# Patient Record
Sex: FEMALE | URBAN - METROPOLITAN AREA
[De-identification: names, ages, dates, MRNs, and addresses within clinical notes are randomized per-mention and may not be internally consistent; named-entity substitution may affect disease eponyms.]

---

## 2018-01-08 ENCOUNTER — APPOINTMENT (RX ONLY)
Dept: URBAN - METROPOLITAN AREA CLINIC 114 | Facility: CLINIC | Age: 63
Setting detail: DERMATOLOGY
End: 2018-01-08

## 2018-01-08 DIAGNOSIS — D18.0 HEMANGIOMA: ICD-10-CM

## 2018-01-08 DIAGNOSIS — L82.1 OTHER SEBORRHEIC KERATOSIS: ICD-10-CM

## 2018-01-08 DIAGNOSIS — L81.4 OTHER MELANIN HYPERPIGMENTATION: ICD-10-CM

## 2018-01-08 DIAGNOSIS — Z87.2 PERSONAL HISTORY OF DISEASES OF THE SKIN AND SUBCUTANEOUS TISSUE: ICD-10-CM

## 2018-01-08 PROBLEM — D18.01 HEMANGIOMA OF SKIN AND SUBCUTANEOUS TISSUE: Status: ACTIVE | Noted: 2018-01-08

## 2018-01-08 PROBLEM — D22.61 MELANOCYTIC NEVI OF RIGHT UPPER LIMB, INCLUDING SHOULDER: Status: ACTIVE | Noted: 2018-01-08

## 2018-01-08 PROBLEM — L90.5 SCAR CONDITIONS AND FIBROSIS OF SKIN: Status: ACTIVE | Noted: 2018-01-08

## 2018-01-08 PROBLEM — D23.71 OTHER BENIGN NEOPLASM OF SKIN OF RIGHT LOWER LIMB, INCLUDING HIP: Status: ACTIVE | Noted: 2018-01-08

## 2018-01-08 PROBLEM — L57.8 OTHER SKIN CHANGES DUE TO CHRONIC EXPOSURE TO NONIONIZING RADIATION: Status: ACTIVE | Noted: 2018-01-08

## 2018-01-08 PROBLEM — E78.5 HYPERLIPIDEMIA, UNSPECIFIED: Status: ACTIVE | Noted: 2018-01-08

## 2018-01-08 PROBLEM — D23.72 OTHER BENIGN NEOPLASM OF SKIN OF LEFT LOWER LIMB, INCLUDING HIP: Status: ACTIVE | Noted: 2018-01-08

## 2018-01-08 PROBLEM — L91.9 HYPERTROPHIC DISORDER OF THE SKIN, UNSPECIFIED: Status: ACTIVE | Noted: 2018-01-08

## 2018-01-08 PROCEDURE — ? COUNSELING

## 2018-01-08 PROCEDURE — 99214 OFFICE O/P EST MOD 30 MIN: CPT

## 2018-01-08 PROCEDURE — ? ADDITIONAL NOTES

## 2018-01-08 ASSESSMENT — LOCATION ZONE DERM
LOCATION ZONE: FACE
LOCATION ZONE: NECK
LOCATION ZONE: TRUNK
LOCATION ZONE: ARM
LOCATION ZONE: LEG

## 2018-01-08 ASSESSMENT — LOCATION DETAILED DESCRIPTION DERM
LOCATION DETAILED: RIGHT PROXIMAL RADIAL DORSAL FOREARM
LOCATION DETAILED: RIGHT MEDIAL MALAR CHEEK
LOCATION DETAILED: RIGHT PROXIMAL POSTERIOR UPPER ARM
LOCATION DETAILED: LEFT CENTRAL MALAR CHEEK
LOCATION DETAILED: RIGHT LATERAL TRAPEZIAL NECK
LOCATION DETAILED: RIGHT RIB CAGE
LOCATION DETAILED: LEFT LATERAL TRAPEZIAL NECK
LOCATION DETAILED: EPIGASTRIC SKIN
LOCATION DETAILED: RIGHT ANTERIOR DISTAL THIGH
LOCATION DETAILED: LEFT PROXIMAL RADIAL DORSAL FOREARM
LOCATION DETAILED: LEFT ANTERIOR DISTAL THIGH
LOCATION DETAILED: RIGHT ANTERIOR DISTAL UPPER ARM
LOCATION DETAILED: LEFT ANTERIOR DISTAL UPPER ARM

## 2018-01-08 ASSESSMENT — LOCATION SIMPLE DESCRIPTION DERM
LOCATION SIMPLE: RIGHT UPPER ARM
LOCATION SIMPLE: LEFT THIGH
LOCATION SIMPLE: POSTERIOR NECK
LOCATION SIMPLE: ABDOMEN
LOCATION SIMPLE: RIGHT FOREARM
LOCATION SIMPLE: LEFT CHEEK
LOCATION SIMPLE: RIGHT CHEEK
LOCATION SIMPLE: RIGHT THIGH
LOCATION SIMPLE: LEFT UPPER ARM
LOCATION SIMPLE: RIGHT POSTERIOR UPPER ARM
LOCATION SIMPLE: LEFT FOREARM

## 2018-02-26 ENCOUNTER — APPOINTMENT (RX ONLY)
Dept: URBAN - METROPOLITAN AREA CLINIC 114 | Facility: CLINIC | Age: 63
Setting detail: DERMATOLOGY
End: 2018-02-26

## 2018-02-26 DIAGNOSIS — Z41.9 ENCOUNTER FOR PROCEDURE FOR PURPOSES OTHER THAN REMEDYING HEALTH STATE, UNSPECIFIED: ICD-10-CM

## 2018-02-26 DIAGNOSIS — L90.8 OTHER ATROPHIC DISORDERS OF SKIN: ICD-10-CM

## 2018-02-26 PROBLEM — L28.1 PRURIGO NODULARIS: Status: ACTIVE | Noted: 2018-02-26

## 2018-02-26 PROBLEM — L82.0 INFLAMED SEBORRHEIC KERATOSIS: Status: ACTIVE | Noted: 2018-02-26

## 2018-02-26 PROCEDURE — ? CHEMICAL PEEL

## 2018-02-26 ASSESSMENT — LOCATION DETAILED DESCRIPTION DERM
LOCATION DETAILED: RIGHT SUPERIOR MEDIAL BUCCAL CHEEK
LOCATION DETAILED: LEFT CENTRAL MALAR CHEEK
LOCATION DETAILED: LEFT INFERIOR MEDIAL FOREHEAD
LOCATION DETAILED: RIGHT MEDIAL FOREHEAD
LOCATION DETAILED: LEFT FOREHEAD
LOCATION DETAILED: RIGHT INFERIOR CENTRAL MALAR CHEEK
LOCATION DETAILED: LEFT CENTRAL BUCCAL CHEEK
LOCATION DETAILED: RIGHT CENTRAL MALAR CHEEK
LOCATION DETAILED: SUPERIOR MID FOREHEAD
LOCATION DETAILED: LEFT CENTRAL MALAR CHEEK
LOCATION DETAILED: RIGHT CHIN

## 2018-02-26 ASSESSMENT — LOCATION SIMPLE DESCRIPTION DERM
LOCATION SIMPLE: RIGHT CHEEK
LOCATION SIMPLE: RIGHT FOREHEAD
LOCATION SIMPLE: LEFT FOREHEAD
LOCATION SIMPLE: LEFT FOREHEAD
LOCATION SIMPLE: RIGHT CHEEK
LOCATION SIMPLE: LEFT CHEEK
LOCATION SIMPLE: SUPERIOR FOREHEAD
LOCATION SIMPLE: LEFT CHEEK
LOCATION SIMPLE: CHIN

## 2018-02-26 ASSESSMENT — LOCATION ZONE DERM
LOCATION ZONE: FACE
LOCATION ZONE: FACE

## 2018-02-26 NOTE — PROCEDURE: CHEMICAL PEEL
Price (Use Numbers Only, No Special Characters Or $): 400
Consent: Prior to the procedure, written consent was obtained and risks were reviewed, including but not limited to: redness, peeling, blistering, pigmentary change, scarring, infection, and pain.
Post-Care Instructions: I reviewed with the patient in detail post-care instructions. Patient should avoid sun exposure and wear sun protection.
Detail Level: Zone
Number Of Layers: 1
Chemical Peel: Vi
Treatment Number: 0
Prep: The treated area was degreased with pre-peel cleanser, and vaseline was applied for protection of mucous membranes.
Post Peel Care: After the procedure, a post-peel cream was applied to the treated areas. Sun protection and post-care instructions were reviewed with the patient.

## 2018-05-31 ENCOUNTER — APPOINTMENT (RX ONLY)
Dept: URBAN - METROPOLITAN AREA CLINIC 114 | Facility: CLINIC | Age: 63
Setting detail: DERMATOLOGY
End: 2018-05-31

## 2018-07-16 ENCOUNTER — APPOINTMENT (RX ONLY)
Dept: URBAN - METROPOLITAN AREA CLINIC 114 | Facility: CLINIC | Age: 63
Setting detail: DERMATOLOGY
End: 2018-07-16

## 2018-07-16 VITALS — HEIGHT: 61 IN | WEIGHT: 114 LBS

## 2018-07-16 DIAGNOSIS — L82.1 OTHER SEBORRHEIC KERATOSIS: ICD-10-CM

## 2018-07-16 DIAGNOSIS — D18.0 HEMANGIOMA: ICD-10-CM

## 2018-07-16 DIAGNOSIS — L81.4 OTHER MELANIN HYPERPIGMENTATION: ICD-10-CM

## 2018-07-16 DIAGNOSIS — D485 NEOPLASM OF UNCERTAIN BEHAVIOR OF SKIN: ICD-10-CM

## 2018-07-16 DIAGNOSIS — L71.0 PERIORAL DERMATITIS: ICD-10-CM

## 2018-07-16 PROBLEM — L57.8 OTHER SKIN CHANGES DUE TO CHRONIC EXPOSURE TO NONIONIZING RADIATION: Status: ACTIVE | Noted: 2018-07-16

## 2018-07-16 PROBLEM — L82.0 INFLAMED SEBORRHEIC KERATOSIS: Status: ACTIVE | Noted: 2018-07-16

## 2018-07-16 PROBLEM — D48.5 NEOPLASM OF UNCERTAIN BEHAVIOR OF SKIN: Status: ACTIVE | Noted: 2018-07-16

## 2018-07-16 PROBLEM — D18.01 HEMANGIOMA OF SKIN AND SUBCUTANEOUS TISSUE: Status: ACTIVE | Noted: 2018-07-16

## 2018-07-16 PROBLEM — D23.9 OTHER BENIGN NEOPLASM OF SKIN, UNSPECIFIED: Status: ACTIVE | Noted: 2018-07-16

## 2018-07-16 PROBLEM — L57.0 ACTINIC KERATOSIS: Status: ACTIVE | Noted: 2018-07-16

## 2018-07-16 PROCEDURE — 11100: CPT

## 2018-07-16 PROCEDURE — ? PRESCRIPTION SAMPLES PROVIDED

## 2018-07-16 PROCEDURE — 99214 OFFICE O/P EST MOD 30 MIN: CPT | Mod: 25

## 2018-07-16 PROCEDURE — ? COUNSELING

## 2018-07-16 PROCEDURE — ? BIOPSY BY SHAVE METHOD

## 2018-07-16 ASSESSMENT — LOCATION ZONE DERM
LOCATION ZONE: NECK
LOCATION ZONE: NOSE
LOCATION ZONE: ARM
LOCATION ZONE: FACE
LOCATION ZONE: TRUNK

## 2018-07-16 ASSESSMENT — LOCATION DETAILED DESCRIPTION DERM
LOCATION DETAILED: RIGHT DISTAL DORSAL FOREARM
LOCATION DETAILED: RIGHT LATERAL TRAPEZIAL NECK
LOCATION DETAILED: LEFT ANTERIOR DISTAL UPPER ARM
LOCATION DETAILED: RIGHT NASAL SIDEWALL
LOCATION DETAILED: LEFT LATERAL TRAPEZIAL NECK
LOCATION DETAILED: INFERIOR THORACIC SPINE
LOCATION DETAILED: RIGHT ANTERIOR SHOULDER
LOCATION DETAILED: LEFT ANTERIOR SHOULDER
LOCATION DETAILED: RIGHT INFERIOR MEDIAL UPPER BACK
LOCATION DETAILED: RIGHT ANTERIOR DISTAL UPPER ARM
LOCATION DETAILED: RIGHT PROXIMAL RADIAL DORSAL FOREARM
LOCATION DETAILED: NASAL DORSUM
LOCATION DETAILED: LEFT CENTRAL MALAR CHEEK
LOCATION DETAILED: LEFT PROXIMAL RADIAL DORSAL FOREARM

## 2018-07-16 ASSESSMENT — LOCATION SIMPLE DESCRIPTION DERM
LOCATION SIMPLE: LEFT UPPER ARM
LOCATION SIMPLE: RIGHT FOREARM
LOCATION SIMPLE: NOSE
LOCATION SIMPLE: RIGHT SHOULDER
LOCATION SIMPLE: LEFT CHEEK
LOCATION SIMPLE: RIGHT NOSE
LOCATION SIMPLE: RIGHT UPPER ARM
LOCATION SIMPLE: POSTERIOR NECK
LOCATION SIMPLE: LEFT FOREARM
LOCATION SIMPLE: RIGHT UPPER BACK
LOCATION SIMPLE: LEFT SHOULDER
LOCATION SIMPLE: UPPER BACK

## 2018-07-16 NOTE — PROCEDURE: BIOPSY BY SHAVE METHOD
Was A Bandage Applied: Yes
Wound Care: Polysporin ointment
Hemostasis: Drysol
Dressing: bandage
Size Of Lesion In Cm: 0.6
Curettage Text: The wound bed was treated with curettage after the biopsy was performed.
Anesthesia Volume In Cc: 0.5
Electrodesiccation Text: The wound bed was treated with electrodesiccation after the biopsy was performed.
Consent: Written consent was obtained and risks were reviewed including but not limited to scarring, infection, bleeding, scabbing, incomplete removal, nerve damage and allergy to anesthesia.
Post-Care Instructions: I reviewed with the patient in detail post-care instructions: If your area was covered with a Band-Aid, remove with your next bath and gently clean daily with soap and water. Do not use hydrogen peroxide. Apply a thin layer of Aquaphor/Vaseline and cover with a Band-Aid. The goal is to keep the wound moist to prevent a scab. It is normal to have a little redness 1/8 inch around the area; however, if increased redness, pain, swelling or discolored drainage occurs, call the office. If bleeding occurs, hold steady pressure for 10-15 minutes to stop it. Our office normally receives your results in 7-10 business days and will call or email you when we receive them. If you do not hear from us in two weeks, please call us.
Anesthesia Type: 1% lidocaine with epinephrine and a 1:10 solution of 8.4% sodium bicarbonate
Biopsy Type: H and E
Bill 11529 For Specimen Handling/Conveyance To Laboratory?: no
Electrodesiccation And Curettage Text: The wound bed was treated with electrodesiccation and curettage after the biopsy was performed.
Additional Anesthesia Volume In Cc (Will Not Render If 0): 0
Type Of Destruction Used: Curettage
Depth Of Biopsy: dermis
Cryotherapy Text: The wound bed was treated with cryotherapy after the biopsy was performed.
Biopsy Method: Dermablade
Billing Type: Third-Party Bill
Silver Nitrate Text: The wound bed was treated with silver nitrate after the biopsy was performed.
Detail Level: Detailed
Notification Instructions: Patient will be notified of biopsy results. However, patient instructed to call the office if not contacted within 2 weeks.

## 2021-05-24 ENCOUNTER — HOSPITAL ENCOUNTER (OUTPATIENT)
Age: 66
Discharge: HOME | End: 2021-05-24
Payer: MEDICARE

## 2021-05-24 LAB
ALBUMIN SERPL-MCNC: 4.6 G/DL (ref 3.5–5.2)
ALBUMIN/GLOBULIN RATIO: 1.8 (ref 1–2.5)
ALP BLD-CCNC: 66 U/L (ref 35–104)
ALT SERPL-CCNC: 20 U/L (ref 5–33)
ANION GAP SERPL CALCULATED.3IONS-SCNC: 8 MMOL/L (ref 9–17)
AST SERPL-CCNC: 20 U/L
BILIRUB SERPL-MCNC: 0.61 MG/DL (ref 0.3–1.2)
BUN BLDV-MCNC: 11 MG/DL (ref 8–23)
BUN/CREAT BLD: ABNORMAL (ref 9–20)
CALCIUM SERPL-MCNC: 9.7 MG/DL (ref 8.6–10.4)
CHLORIDE BLD-SCNC: 103 MMOL/L (ref 98–107)
CHOLESTEROL, FASTING: 195 MG/DL
CHOLESTEROL/HDL RATIO: 2.9
CO2: 30 MMOL/L (ref 20–31)
CREAT SERPL-MCNC: 0.5 MG/DL (ref 0.5–0.9)
GFR AFRICAN AMERICAN: >60 ML/MIN
GFR NON-AFRICAN AMERICAN: >60 ML/MIN
GFR SERPL CREATININE-BSD FRML MDRD: ABNORMAL ML/MIN/{1.73_M2}
GFR SERPL CREATININE-BSD FRML MDRD: ABNORMAL ML/MIN/{1.73_M2}
GLUCOSE BLD-MCNC: 91 MG/DL (ref 70–99)
HDLC SERPL-MCNC: 67 MG/DL
LDL CHOLESTEROL: 105 MG/DL (ref 0–130)
POTASSIUM SERPL-SCNC: 4.4 MMOL/L (ref 3.7–5.3)
SODIUM BLD-SCNC: 141 MMOL/L (ref 135–144)
TOTAL PROTEIN: 7.2 G/DL (ref 6.4–8.3)
TRIGLYCERIDE, FASTING: 115 MG/DL
VLDLC SERPL CALC-MCNC: NORMAL MG/DL (ref 1–30)

## 2021-05-24 PROCEDURE — 80053 COMPREHEN METABOLIC PANEL: CPT

## 2021-05-24 PROCEDURE — 80061 LIPID PANEL: CPT

## 2021-05-24 PROCEDURE — 36415 COLL VENOUS BLD VENIPUNCTURE: CPT

## 2021-10-31 ENCOUNTER — HOSPITAL ENCOUNTER (EMERGENCY)
Age: 66
Discharge: HOME | End: 2021-10-31
Attending: EMERGENCY MEDICINE
Payer: MEDICARE

## 2021-10-31 ENCOUNTER — APPOINTMENT (OUTPATIENT)
Dept: GENERAL RADIOLOGY | Age: 66
End: 2021-10-31
Payer: MEDICARE

## 2021-10-31 VITALS
BODY MASS INDEX: 19.83 KG/M2 | WEIGHT: 105 LBS | OXYGEN SATURATION: 98 % | DIASTOLIC BLOOD PRESSURE: 73 MMHG | HEART RATE: 97 BPM | RESPIRATION RATE: 15 BRPM | SYSTOLIC BLOOD PRESSURE: 143 MMHG | TEMPERATURE: 98.1 F | HEIGHT: 61 IN

## 2021-10-31 DIAGNOSIS — M25.572 ACUTE BILATERAL ANKLE PAIN: Primary | ICD-10-CM

## 2021-10-31 DIAGNOSIS — M25.571 ACUTE BILATERAL ANKLE PAIN: Primary | ICD-10-CM

## 2021-10-31 PROCEDURE — 73610 X-RAY EXAM OF ANKLE: CPT

## 2021-10-31 PROCEDURE — 99284 EMERGENCY DEPT VISIT MOD MDM: CPT

## 2021-10-31 RX ORDER — IBUPROFEN 600 MG/1
600 TABLET ORAL EVERY 6 HOURS PRN
Qty: 30 TABLET | Refills: 0 | Status: SHIPPED | OUTPATIENT
Start: 2021-10-31

## 2021-10-31 ASSESSMENT — PAIN DESCRIPTION - LOCATION: LOCATION: ANKLE

## 2021-10-31 ASSESSMENT — PAIN DESCRIPTION - FREQUENCY: FREQUENCY: INTERMITTENT

## 2021-10-31 ASSESSMENT — PAIN DESCRIPTION - DESCRIPTORS: DESCRIPTORS: DULL

## 2021-10-31 ASSESSMENT — PAIN DESCRIPTION - PAIN TYPE: TYPE: ACUTE PAIN

## 2021-10-31 ASSESSMENT — PAIN DESCRIPTION - ORIENTATION: ORIENTATION: RIGHT

## 2021-10-31 ASSESSMENT — PAIN SCALES - GENERAL: PAINLEVEL_OUTOF10: 1

## 2021-10-31 NOTE — ED PROVIDER NOTES
[address] [facility]  [address] [facility] [email or web address] [facility] [address]  Phone: [phone]  Fax: [phone]        Pt Name: Laura Perez  MRN: [id number]  [place] 1955  Date of evaluation: 10/31/21      CHIEF COMPLAINT     Chief Complaint   Patient presents with    Ankle Pain     Rt          HISTORY OF PRESENT ILLNESS  (Location/Symptom, Timing/Onset, Context/Setting, Quality, Duration, Modifying Factors, Severity.)    Laura Perez is a 72 y.o. female who presents with bilateral ankle pain. The patient dates that she was walking and twisted both ankles yesterday when she rolled her ankles on an uneven portion of the sidewalk the patient states that primarily she has right ankle pain but she does have some mild left ankle pain she did have a previous fracture of her right ankle no numbness no weakness the patient states that certain movements makes the pain a little worse otherwise nothing else changes her symptoms she did take some over-the-counter pain medications and has no pain at rest it is only with certain movements that she has pain in the right ankle      REVIEW OF SYSTEMS    (2-9 systems for level 4, 10 or more for level 5)     Review of Systems   Musculoskeletal:        Bilateral ankle pain   Skin: Negative. Neurological: Negative for weakness and numbness. PAST MEDICAL HISTORY    has no past medical history on file. SURGICAL HISTORY      has a past surgical history that includes Hysterectomy and Cholecystectomy. CURRENTMEDICATIONS       Previous Medications    No medications on file       ALLERGIES     has No Known Allergies. FAMILY HISTORY     has no family status information on file. family history is not on file. SOCIAL HISTORY      reports that she has never smoked. She has never used smokeless tobacco. She reports previous alcohol use. She reports that she does not use drugs.     PHYSICAL EXAM    (up to 7 for level 4, 8 or more for level 5)   INITIAL VITALS:  height is 5' 1\" (1.549 m) and weight is 47.6 kg (105 lb). Her oral temperature is 98.1 °F (36.7 °C). Her blood pressure is 143/73 (abnormal) and her pulse is 97. Her respiration is 15 and oxygen saturation is 98%. Physical Exam  Vitals and nursing note reviewed. Constitutional:       Appearance: Normal appearance. HENT:      Head: Normocephalic and atraumatic. Eyes:      Conjunctiva/sclera: Conjunctivae normal.   Musculoskeletal:         General: Normal range of motion. Cervical back: Normal range of motion and neck supple. Comments: Patient is noted to have some slight tenderness to the medial aspect of the right ankle no proximal fibula tenderness no fifth metatarsal tenderness no laxity of the right ankle appreciated good pulses motor sensation of the right lower extremity the patient describes a vague discomfort in the left ankle there is no specific bony point tenderness no laxity of the left ankle no proximal fibula tenderness no fifth metatarsal tenderness good pulses motor sensation in the left lower extremity   Skin:     General: Skin is warm and dry. Capillary Refill: Capillary refill takes less than 2 seconds. Findings: No rash. Neurological:      General: No focal deficit present. Mental Status: She is alert. DIFFERENTIAL DIAGNOSIS/ MDM:     I will get x-rays of the right and left ankle    DIAGNOSTIC RESULTS         RADIOLOGY:        Interpretation per the Radiologist below, if available at the time of this note:    XR ANKLE RIGHT (MIN 3 VIEWS) (Final result)  Result time 10/31/21 10:11:38  Final result by Haven Stevenson MD (10/31/21 10:11:38)                Impression:    No acute bony or joint abnormality             Narrative:    EXAMINATION:   THREE XRAY VIEWS OF THE RIGHT ANKLE; THREE XRAY VIEWS OF THE LEFT ANKLE     10/31/2021 10:07 am     COMPARISON:   None.      HISTORY:   ORDERING SYSTEM PROVIDED HISTORY: pain   TECHNOLOGIST PROVIDED HISTORY:   pain   Reason for Exam: rt ankle pain   Acuity: Acute   Type of Exam: Initial   Mechanism of Injury: twisted; ORDERING SYSTEM PROVIDED HISTORY: pain   TECHNOLOGIST PROVIDED HISTORY:   pain   Reason for Exam: left ankle pain   Acuity: Acute   Type of Exam: Initial   Mechanism of Injury: twisted     FINDINGS:   Right ankle: Anatomic alignment.  Joint spaces appear normal.  No fracture. Left ankle: Anatomic alignment.  Joint spaces appear normal.  No fracture.                     XR ANKLE LEFT (MIN 3 VIEWS) (Final result)  Result time 10/31/21 10:11:38  Final result by Maddie Travis MD (10/31/21 10:11:38)                Impression:    No acute bony or joint abnormality             Narrative:    EXAMINATION:   THREE XRAY VIEWS OF THE RIGHT ANKLE; THREE XRAY VIEWS OF THE LEFT ANKLE     10/31/2021 10:07 am     COMPARISON:   None. HISTORY:   ORDERING SYSTEM PROVIDED HISTORY: pain   TECHNOLOGIST PROVIDED HISTORY:   pain   Reason for Exam: rt ankle pain   Acuity: Acute   Type of Exam: Initial   Mechanism of Injury: twisted; ORDERING SYSTEM PROVIDED HISTORY: pain   TECHNOLOGIST PROVIDED HISTORY:   pain   Reason for Exam: left ankle pain   Acuity: Acute   Type of Exam: Initial   Mechanism of Injury: twisted     FINDINGS:   Right ankle: Anatomic alignment.  Joint spaces appear normal.  No fracture. Left ankle: Anatomic alignment.  Joint spaces appear normal.  No fracture. LABS:  No results found for this visit on 10/31/21.         EMERGENCY DEPARTMENT COURSE:   Vitals:    Vitals:    10/31/21 0946   BP: (!) 143/73   Pulse: 97   Resp: 15   Temp: 98.1 °F (36.7 °C)   TempSrc: Oral   SpO2: 98%   Weight: 47.6 kg (105 lb)   Height: 5' 1\" (1.549 m)     -------------------------  BP: (!) 143/73, Temp: 98.1 °F (36.7 °C), Pulse: 97, Resp: 15      RE-EVALUATION:  X-ray is reported by radiology as no acute process we will Ace wrap both ankles I did offer the patient crutches which she refuses I am recommending the Ace wrap and crutches for ambulating and support rest ice elevation as possible  The patient presented with ankle pain. A fracture was not detected on X-ray. The knee joint was not affected and the foot is stable on exam. No skin lesions were seen. There are no signs of compartment syndrome. The pulses are 2/4. The motor is 5/5. The sensation is intact. The patient was instructed to follow up in 2-3 days with their family doctor or orthopedics. We also discussed returning to the Emergency Department immediately if new or worsening symptoms occur. We have discussed the symptoms which are most concerning (e.g., increasing pain, numbness, weakness, color change or coldness to the extremity) that necessitate immediate return. The patient understands that at this time there is no evidence for a more malignant underlying process, but the patient also understands that early in the process of an illness or injury, an emergency department workup can be falsely reassuring. Routine discharge counseling was given, and the patient understands that worsening, changing or persistent symptoms should prompt an immediate call or follow up with their primary physician or return to the emergency department. The importance of appropriate follow up was also discussed. I have reviewed the disposition diagnosis with the patient and or their family/guardian. I have answered their questions and given discharge instructions. They voiced understanding of these instructions and did not have any further questions or complaints. PROCEDURES:  None    FINAL IMPRESSION      1.  Acute bilateral ankle pain          DISPOSITION/PLAN   DISPOSITION Decision To Discharge 10/31/2021 10:29:39 AM      CONDITION ON DISPOSITION:   Stable    PATIENT REFERRED TO:  Lisa Bishop   [address]  [phone]    Call in 2 days        DISCHARGE MEDICATIONS:  New Prescriptions    IBUPROFEN

## 2021-10-31 NOTE — ED NOTES
Pt given written and verbal discharge,f/u instructions with reminder to p/u medications at preferred pharmacy. Pt verbalizes understanding.  Pt is ambulatory with steady gait      Evie Johnson RN  10/31/21 9519

## 2021-10-31 NOTE — ED TRIAGE NOTES
Pt reports twisting bilateral ankles after tripping during a walk. Pt reports constant Rt ankle pain, worsens with weight bearing.

## 2023-08-23 ENCOUNTER — HOSPITAL ENCOUNTER (OUTPATIENT)
Dept: OCCUPATIONAL THERAPY | Age: 68
Setting detail: THERAPIES SERIES
Discharge: HOME | End: 2023-08-23
Payer: MEDICARE

## 2023-08-23 PROCEDURE — 97537 COMMUNITY/WORK REINTEGRATION: CPT

## 2023-08-23 PROCEDURE — 97167 OT EVAL HIGH COMPLEX 60 MIN: CPT

## 2023-08-23 NOTE — PROGRESS NOTES
[address] [facility]   Occupational Therapy Clinical Visual Assessment  Date: 23  Patient Name: Adrianne Lopes      MRN: [id number]  Account: [de-identified]   : 1955  (78 y.o.)  Gender: female     [facility]              [address] [facility] [facility] [address]              Phone: [phone]              Fax: [phone]      Referring Physician: Dr. Michael Ramírez: [place]  Primary Care Physician: Dr. Tu Heller  Neurologist:    Medical Diagnostic Code(s): Right Parietal/Occipital Stroke (I63.9); Multiple Sclerosis (G35)  Rehab Diagnostic Code(s): Left homonymous hemianopsia (H53.462  Date of symptom onset:     Total Visits: 1                 Cx/Ns:     Precautions/Fall Risk: Left homonymous hemianopsia; retinal artery occlusion    Comorbidities: Multiple sclerosis    Subjective: Patient reported that she does not have a goal of return to driving. 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 folic acid (FOLVITE) 1 mg tablet Take 1 tablet (1 mg total) by mouth in the morning.  30 tablet 12     Visual Perceptual Assessment:   Corrective

## 2023-09-06 ENCOUNTER — APPOINTMENT (OUTPATIENT)
Dept: OCCUPATIONAL THERAPY | Age: 68
End: 2023-09-06
Payer: MEDICARE

## 2023-09-11 ENCOUNTER — HOSPITAL ENCOUNTER (OUTPATIENT)
Dept: OCCUPATIONAL THERAPY | Age: 68
Setting detail: THERAPIES SERIES
Discharge: HOME | End: 2023-09-11
Payer: MEDICARE

## 2023-09-11 PROCEDURE — 97537 COMMUNITY/WORK REINTEGRATION: CPT

## 2023-09-11 NOTE — PROGRESS NOTES
visual field;  2. Pt will improve visual pursuits;  3. Pt will increase visual saccades;  4. Pt will improve score on visuo-spatial skills;  5. Pt will increase sustained visual attention;  6. Pt will improve divided visual attention; &  7. Pt will increase recall of visual information using the BITS system. LONG TERM GOAL(S): Patient will demonstrate improvements in vision, perception and cognition required to perform simulated tests of driving. Patient Stated Goals: To determine eligibility to pursue  evaluation. Pt. Education:  [x] Yes   [x] Reviewed   Method of Education: [x] Verbal  [] Demo    Comprehension of Education:  [x] Verbalizes understanding. [] Demonstrates understanding. [x] Needs review.   [] Demonstrates/verbalizes      PLAN (FREQUENCY & DURATION): 2 times per week for a total of 12 visits      Treatment Charges: Mins Units    Evaluation       [x] Community Reintegration  60  4   Total Treatment Time  60        Time In: 12:45 pm Time Out: 1:45 pm      Electronically signed by: Aloysius Canavan, MS, OTR/L, LICDC, S

## 2023-09-13 ENCOUNTER — HOSPITAL ENCOUNTER (OUTPATIENT)
Dept: OCCUPATIONAL THERAPY | Age: 68
Setting detail: THERAPIES SERIES
Discharge: HOME | End: 2023-09-13
Payer: MEDICARE

## 2023-09-13 PROCEDURE — 97537 COMMUNITY/WORK REINTEGRATION: CPT

## 2023-09-13 NOTE — PROGRESS NOTES
[address] [facility]  Occupational Therapy Treatment Note  Date: 23  Patient Name: Jaiden Varela    MRN: [id number]  Account: [de-identified]   : 1955  (78 y.o.) Gender: female      [address] [facility]              [address] [facility] [facility] [address]              Phone: [phone]              Fax: [phone]      Referring Physician: Dr. Craig Selby: [place]  Primary Care Physician: Dr. Sparkle Gan  Neurologist:     Medical Diagnostic Code(s): Right Parietal/Occipital Stroke (I63.9); Multiple Sclerosis (G35)  Rehab Diagnostic Code(s): Left homonymous hemianopsia (P22.955)  Date of symptom onset:      Total Visits: 3                           Cx/Ns:      Precautions/Fall Risk: Left homonymous hemianopsia; retinal artery occlusion     Comorbidities: Multiple sclerosis     Subjective: Patient reported that she does not have a goal of return to driving. Patient demonstrating impaired insight into her limitations. Objective:     Reason for Referral: Left homonymous hemianopsia; MRI revealed chronic ischemic microvascular; mild degeneration of C4-5 & C5-6; carotid artery stenosis     Results of Clinical Assessment 23: Patient scores on clinical visual assessment revealed impairments in attention to visual stimuli presented in the left visual field; impaired dynamic vision in the areas of visual pursuits, saccades & convergence; & impaired visuo-spatial skills; patient scores on cognitive screen revealed impairments in immediate & delayed visual recall, sustained visual and auditory attention and divided visual attention.      [address] [facility] ([facility] 23: Patient required verbal & physical cueing to complete visual and visuospatial tasks requiring visual scanning, peripheral field vision, visual pursuits, visual saccades, visuospatial skills, sustained & divided visual

## 2023-09-15 ENCOUNTER — HOSPITAL ENCOUNTER (OUTPATIENT)
Dept: OCCUPATIONAL THERAPY | Age: 68
Setting detail: THERAPIES SERIES
Discharge: HOME | End: 2023-09-15
Payer: MEDICARE

## 2023-09-15 PROCEDURE — 97537 COMMUNITY/WORK REINTEGRATION: CPT

## 2023-09-15 NOTE — PROGRESS NOTES
[address] [facility]  Occupational Therapy  Rehabilitation  Treatment Note  Date: 9/15/23  Patient Name: Yovani Duran    MRN: [id number]  Account: [de-identified]   : 1955  (78 y.o.) Gender: female     [facility]              [address] [facility] [address]              Phone: [phone]              Fax: [phone]      Referring Physician: Dr. Víctor Knight: [place]  Primary Care Physician: Dr. Arlyn Bledsoe  Neurologist:     Medical Diagnostic Code(s): Right Parietal/Occipital Stroke (I63.9); Multiple Sclerosis (G35)  Rehab Diagnostic Code(s): Left homonymous hemianopsia (O48.199)  Date of symptom onset:      Total Visits: 4                           Cx/Ns:      Precautions/Fall Risk: Left homonymous hemianopsia; retinal artery occlusion     Comorbidities: Multiple sclerosis     Subjective: Patient reported that she does not have a goal of return to driving. Patient demonstrating impaired insight into her limitations. Objective:     Reason for Referral: Left homonymous hemianopsia; MRI revealed chronic ischemic microvascular; mild degeneration of C4-5 & C5-6; carotid artery stenosis     Results of Clinical Assessment 23: Patient scores on clinical visual assessment revealed impairments in attention to visual stimuli presented in the left visual field; impaired dynamic vision in the areas of visual pursuits, saccades & convergence; & impaired visuo-spatial skills; patient scores on cognitive screen revealed impairments in immediate & delayed visual recall, sustained visual and auditory attention and divided visual attention.      [facility] 23: Patient required verbal & physical cueing to complete visual and visual motor tasks requiring visual scanning, peripheral field vision, visual pursuits, visual saccades, visuospatial skills, sustained

## 2023-09-20 NOTE — PROGRESS NOTES
divided visual attention, visual memory & visual processing speed. Most Recent Simulated Driving Assessments 0/53/72: Patient demonstrated impaired occupational performance skills in the areas of divided visual attention; spatial orientation; orientation to midline; visual response speed; attention to visual stimuli in the left periphery & visual memory. Simulated Driving Assessments 7/21/66:    Assessment Drive Basic Vehicle Control Intermediate Level Drive: Patient able to avoid potential collisions throughout drive; patient cited for 1 stop throughout drive; patient able to safely negotiate 4/5 intersections throughout the drive; patient below posted speed throughout drive with an average speed of 13 mph. Patient crossed center 11x, off road 12x & was out of juan 21% of drive time. Complete Practice Assessment Advanced Level Drive: Patient able to avoid potential collisions throughout drive; patient cited for 2 stops; patient consistently below the speed limit throughout drive with an average speed of 15 mph; patient crossed center 4x, off road 10x & out of juan 12% of drive time; patient able to safely negotiate 4/5 intersections. Assessment: Patient demonstrated impaired occupational performance skills in the areas of divided visual attention; spatial orientation; orientation to midline; visual response speed; attention to visual stimuli in the left periphery & visual memory on BITS. Patient with impaired  performance skills in the areas of speed control, juan positioning & consistent adherence to stops on simulated assessments of driving.      Recommendations: Recommend patient participate in skilled occupational therapy services in order to improve attention to visual stimuli presented in the left visual field; dynamic vision in the areas of visual pursuits, saccades & convergence; & visuo-spatial skills and cognition in the areas of immediate & delayed visual recall, sustained visual and

## 2023-09-21 ENCOUNTER — HOSPITAL ENCOUNTER (OUTPATIENT)
Dept: OCCUPATIONAL THERAPY | Age: 68
Setting detail: THERAPIES SERIES
Discharge: HOME | End: 2023-09-21
Payer: MEDICARE

## 2023-09-21 PROCEDURE — 97537 COMMUNITY/WORK REINTEGRATION: CPT

## 2023-09-26 ENCOUNTER — HOSPITAL ENCOUNTER (OUTPATIENT)
Dept: OCCUPATIONAL THERAPY | Age: 68
Setting detail: THERAPIES SERIES
Discharge: HOME | End: 2023-09-26
Payer: MEDICARE

## 2023-09-26 PROCEDURE — 97537 COMMUNITY/WORK REINTEGRATION: CPT

## 2023-09-29 ENCOUNTER — HOSPITAL ENCOUNTER (OUTPATIENT)
Dept: OCCUPATIONAL THERAPY | Age: 68
Setting detail: THERAPIES SERIES
Discharge: HOME | End: 2023-09-29
Payer: MEDICARE

## 2023-09-29 PROCEDURE — 97537 COMMUNITY/WORK REINTEGRATION: CPT

## 2023-09-29 NOTE — PROGRESS NOTES
[address] [facility]  Occupational Therapy  Rehabilitation  Treatment Note    Date: 23  Patient Name: Yovani Duran    MRN: [id number]  Account: [de-identified]   : 1955  (78 y.o.) Gender: female     [facility]              [address] [facility] [address]              Phone: [phone]              Fax: [phone]      Referring Physician: Dr. Víctor Knight: [place]  Primary Care Physician: Dr. Arlyn Bledsoe  Neurologist:     Medical Diagnostic Code(s): Right Parietal/Occipital Stroke (I63.9); Multiple Sclerosis (G35)  Rehab Diagnostic Code(s): Left homonymous hemianopsia (C96.653)  Date of symptom onset:      Total Visits: 7                           Cx/Ns:      Precautions/Fall Risk: Left homonymous hemianopsia; retinal artery occlusion     Comorbidities: Chronic ischemic microvascular disease     Subjective: Patient reported that she would like to participate in simulated driving program.     Objective:     Reason for Referral: Left homonymous hemianopsia; MRI revealed chronic ischemic microvascular; mild degeneration of C4-5 & C5-6; carotid artery stenosis     Results of Clinical Assessment 23: Patient scores on clinical visual assessment revealed impairments in attention to visual stimuli presented in the left visual field; impaired dynamic vision in the areas of visual pursuits, saccades & convergence; & impaired visuo-spatial skills; patient scores on cognitive screen revealed impairments in immediate & delayed visual recall, sustained visual and auditory attention and divided visual attention.      [address] [facility] ([facility] 23: Patient demonstrated impaired occupational performance skills in the areas of divided visual attention; spatial orientation; orientation to midline; visual response speed; attention to visual stimuli in the left periphery &